# Patient Record
Sex: MALE | Race: WHITE | NOT HISPANIC OR LATINO | Employment: UNEMPLOYED | URBAN - METROPOLITAN AREA
[De-identification: names, ages, dates, MRNs, and addresses within clinical notes are randomized per-mention and may not be internally consistent; named-entity substitution may affect disease eponyms.]

---
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
## 2025-05-19 ENCOUNTER — OFFICE VISIT (OUTPATIENT)
Dept: FAMILY MEDICINE CLINIC | Facility: CLINIC | Age: 20
End: 2025-05-19
Payer: COMMERCIAL

## 2025-05-19 VITALS
SYSTOLIC BLOOD PRESSURE: 109 MMHG | DIASTOLIC BLOOD PRESSURE: 67 MMHG | HEART RATE: 70 BPM | BODY MASS INDEX: 24.11 KG/M2 | WEIGHT: 178 LBS | HEIGHT: 72 IN | OXYGEN SATURATION: 97 %

## 2025-05-19 DIAGNOSIS — K21.9 GASTROESOPHAGEAL REFLUX DISEASE WITHOUT ESOPHAGITIS: Primary | ICD-10-CM

## 2025-05-19 DIAGNOSIS — F90.2 ATTENTION DEFICIT HYPERACTIVITY DISORDER (ADHD), COMBINED TYPE: ICD-10-CM

## 2025-05-19 DIAGNOSIS — H61.23 BILATERAL IMPACTED CERUMEN: ICD-10-CM

## 2025-05-19 DIAGNOSIS — Z23 NEED FOR VACCINATION: ICD-10-CM

## 2025-05-19 DIAGNOSIS — Z00.00 ANNUAL PHYSICAL EXAM: ICD-10-CM

## 2025-05-19 DIAGNOSIS — F32.A ANXIETY AND DEPRESSION: ICD-10-CM

## 2025-05-19 DIAGNOSIS — F41.9 ANXIETY AND DEPRESSION: ICD-10-CM

## 2025-05-19 PROCEDURE — 90471 IMMUNIZATION ADMIN: CPT | Performed by: FAMILY MEDICINE

## 2025-05-19 PROCEDURE — 90651 9VHPV VACCINE 2/3 DOSE IM: CPT | Performed by: FAMILY MEDICINE

## 2025-05-19 PROCEDURE — 69210 REMOVE IMPACTED EAR WAX UNI: CPT | Performed by: FAMILY MEDICINE

## 2025-05-19 PROCEDURE — 99214 OFFICE O/P EST MOD 30 MIN: CPT | Performed by: FAMILY MEDICINE

## 2025-05-19 PROCEDURE — 90472 IMMUNIZATION ADMIN EACH ADD: CPT | Performed by: FAMILY MEDICINE

## 2025-05-19 PROCEDURE — 90620 MENB-4C VACCINE IM: CPT | Performed by: FAMILY MEDICINE

## 2025-05-19 PROCEDURE — 90715 TDAP VACCINE 7 YRS/> IM: CPT | Performed by: FAMILY MEDICINE

## 2025-05-19 PROCEDURE — 99385 PREV VISIT NEW AGE 18-39: CPT | Performed by: FAMILY MEDICINE

## 2025-05-19 RX ORDER — OMEPRAZOLE 20 MG/1
20 CAPSULE, DELAYED RELEASE ORAL DAILY
COMMUNITY

## 2025-05-19 RX ORDER — DEXTROAMPHETAMINE SACCHARATE, AMPHETAMINE ASPARTATE MONOHYDRATE, DEXTROAMPHETAMINE SULFATE AND AMPHETAMINE SULFATE 3.75; 3.75; 3.75; 3.75 MG/1; MG/1; MG/1; MG/1
15 CAPSULE, EXTENDED RELEASE ORAL EVERY MORNING
Qty: 30 CAPSULE | Refills: 0 | Status: SHIPPED | OUTPATIENT
Start: 2025-05-19

## 2025-05-19 RX ORDER — DEXTROAMPHETAMINE SACCHARATE, AMPHETAMINE ASPARTATE, DEXTROAMPHETAMINE SULFATE AND AMPHETAMINE SULFATE 1.875; 1.875; 1.875; 1.875 MG/1; MG/1; MG/1; MG/1
7.5 TABLET ORAL
Qty: 30 TABLET | Refills: 0 | Status: SHIPPED | OUTPATIENT
Start: 2025-05-19

## 2025-05-19 RX ORDER — DEXTROAMPHETAMINE SACCHARATE, AMPHETAMINE ASPARTATE, DEXTROAMPHETAMINE SULFATE AND AMPHETAMINE SULFATE 1.875; 1.875; 1.875; 1.875 MG/1; MG/1; MG/1; MG/1
TABLET ORAL
COMMUNITY
Start: 2025-04-19 | End: 2025-05-19 | Stop reason: SDUPTHER

## 2025-05-19 RX ORDER — MIRTAZAPINE 15 MG/1
15 TABLET, FILM COATED ORAL
COMMUNITY
Start: 2025-04-20 | End: 2025-05-19 | Stop reason: SDUPTHER

## 2025-05-19 RX ORDER — DEXTROAMPHETAMINE SACCHARATE, AMPHETAMINE ASPARTATE MONOHYDRATE, DEXTROAMPHETAMINE SULFATE AND AMPHETAMINE SULFATE 3.75; 3.75; 3.75; 3.75 MG/1; MG/1; MG/1; MG/1
15 CAPSULE, EXTENDED RELEASE ORAL EVERY MORNING
COMMUNITY
Start: 2025-04-21 | End: 2025-05-19 | Stop reason: SDUPTHER

## 2025-05-19 RX ORDER — MIRTAZAPINE 15 MG/1
15 TABLET, FILM COATED ORAL
Qty: 90 TABLET | Refills: 1 | Status: SHIPPED | OUTPATIENT
Start: 2025-05-19

## 2025-05-19 NOTE — PROGRESS NOTES
Chief Complaint  Chief Complaint   Patient presents with    [facility]       Subjective    History of Present Illness  Sharath Lee is a 20 y.o. male presents to [facility] to establish care.  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 Feels rested when he wakes up most of the time. No snoring that he is aware of.   Mood: Pretty good, some increase in irritability but maybe due to being back home and when an internship fell through.   Health goals:     History of Present Illness  The patient presents to [facility].    He is a student at the [employer], currently residing at home for the summer. He maintains an active lifestyle, engaging in outdoor activities such as fishing, camping, and skiing. His diet is generally healthy, with a significant portion of his meals being home-cooked. He is not eating fast food. He aims to increase his physical activity to 5 days per week during the summer. He has a strong support system consisting of his family and friends. His sleep pattern is consistent, averaging 7 to 8 hours per night, and he reports feeling rested upon waking. He is not aware of any snoring. His mood is generally positive, although he has experienced increased irritability recently due to an unfortunate incident related to an internship. He has no specific health goals or concerns at this time.    He takes omeprazole intermittently for acid reflux or heartburn, which he attributes to previous daily marijuana use. He has not undergone endoscopy. He has never had omeprazole as an actual prescription, he only got it over the counter in  the past.    He takes mirtazapine daily, prescribed by his psychiatrist to aid sleep and appetite, which have been affected by his ADHD medication and marijuana use. He reports that mirtazapine has significantly improved his weight and sleep. He also takes Prozac 20 mg for anxiety and depression.    His Adderall dosage includes 7.5 mg IR and 15 mg XR, which he finds beneficial for school and work. He takes the IR dose approximately once every 3 days. He has previously tried Concerta and Vyvanse but prefers Adderall due to its shorter duration of action.    He has no significant childhood diagnoses or surgical history. He has not had his wisdom teeth removed. He consumes approximately one-third of a cigarette weekly and drinks about 3 beers per week. He uses marijuana 3 days per week. He is sexually active with female partners and occasionally uses condoms He does not use e-cigarettes or vaping devices. He has not had an eye examination in the past year but has seen a dentist within the same period.    SOCIAL HISTORY  He is a student at the [employer], currently residing at home for the summer. He consumes approximately one-third of a cigarette weekly and drinks about 3 beers per week. He uses marijuana 3 days per week.    FAMILY HISTORY  His mother and sister have ADHD and anxiety. His father has depression and high blood pressure. He has a cousin with type I diabetes. His paternal grandfather had leukemia. There is no family history of heart attack or stroke.    Current Outpatient Medications on File Prior to Visit   Medication Sig Dispense Refill    omeprazole (priLOSEC) 20 MG capsule Take 1 capsule by mouth Daily.      [DISCONTINUED] amphetamine-dextroamphetamine (ADDERALL) 7.5 MG tablet TAKE 1 TABLET BY MOUTH DAILY IN THE AFTERNOON      [DISCONTINUED] amphetamine-dextroamphetamine XR (ADDERALL XR) 15 MG 24 hr capsule Take 1 capsule by mouth Every Morning      [DISCONTINUED] FLUoxetine (PROzac) 20 MG  capsule Take 1 capsule by mouth Every Morning.      [DISCONTINUED] mirtazapine (REMERON) 15 MG tablet Take 1 tablet by mouth every night at bedtime.       No current facility-administered medications on file prior to visit.       Patient Active Problem List   Diagnosis    Gastroesophageal reflux disease without esophagitis    Attention deficit hyperactivity disorder (ADHD), combined type    Anxiety and depression       Past Medical History:   Diagnosis Date    ADHD (attention deficit hyperactivity disorder)     Anxiety        History reviewed. No pertinent surgical history.    Family History   Problem Relation Age of Onset    Anxiety disorder Mother     ADD / ADHD Mother     Hypertension Father     Depression Father     Anxiety disorder Sister     ADD / ADHD Sister     Leukemia Paternal Grandfather     Diabetes type I Cousin     Hyperlipidemia Neg Hx     Stroke Neg Hx        Health Maintenance Summary            Current Care Gaps       Pneumococcal Vaccine 0-49 (1 of 2 - PCV) Overdue since 4/20/2024      2005  Imm Admin: PEDS-Pneumococcal Conjugate (PCV7)    2005  Imm Admin: PEDS-Pneumococcal Conjugate (PCV7)    2005  Imm Admin: PEDS-Pneumococcal Conjugate (PCV7)              COVID-19 Vaccine (4 - 2024-25 season) Overdue since 9/1/2024 02/20/2022  Imm Admin: Covid-19 (Pfizer) Gray Cap Monovalent    05/25/2021  Imm Admin: COVID-19 (PFIZER) Purple Cap Monovalent    05/04/2021  Imm Admin: COVID-19 (PFIZER) Purple Cap Monovalent              HEPATITIS C SCREENING (Once) Never done     No completion, postpone, or frequency change history exists for this topic.              HPV VACCINES (2 - Male 3-dose series) Due soon on 6/16/2025 05/19/2025  Imm Admin: Hpv9                      Upcoming       INFLUENZA VACCINE (Yearly - July to March) Next due on 7/1/2025 2005  Imm Admin: Influenza Seasonal Injectable    2005  Imm Admin: Influenza Seasonal Injectable               "MENINGOCOCCAL B VACCINE (2 of 2 - Bexsero SCDM 2-dose series) Next due on 11/19/2025 05/19/2025  Imm Admin: Meningococcal B,(Bexsero)              ANNUAL PHYSICAL (Yearly) Next due on 5/19/2026 05/19/2025  Done              TDAP/TD VACCINES (2 - Td or Tdap) Next due on 5/19/2035 05/19/2025  Imm Admin: Tdap                      Completed or No Longer Recommended       MENINGOCOCCAL VACCINE (Series Information) Aged Out     No completion, postpone, or frequency change history exists for this topic.                               Objective   Vitals:    05/19/25 1127   BP: 109/67   Pulse: 70   SpO2: 97%   Weight: 80.7 kg (178 lb)   Height: 182.9 cm (72\")      Physical Exam  Vitals reviewed.   Constitutional:       General: He is not in acute distress.     Appearance: Normal appearance.   HENT:      Head: Normocephalic and atraumatic.      Right Ear: Ear canal and external ear normal. There is impacted cerumen.      Left Ear: Ear canal and external ear normal. There is impacted cerumen.      Nose: Nose normal. No rhinorrhea.      Mouth/Throat:      Mouth: Mucous membranes are moist.      Pharynx: No posterior oropharyngeal erythema.   Eyes:      Extraocular Movements: Extraocular movements intact.      Conjunctiva/sclera: Conjunctivae normal.      Pupils: Pupils are equal, round, and reactive to light.   Neck:      Comments: No thyromegaly or thyroid nodule on palpation  Cardiovascular:      Rate and Rhythm: Normal rate and regular rhythm.      Pulses: Normal pulses.      Heart sounds: Normal heart sounds. No murmur heard.  Pulmonary:      Effort: Pulmonary effort is normal.      Breath sounds: Normal breath sounds. No wheezing.   Abdominal:      General: Bowel sounds are normal. There is no distension.      Palpations: Abdomen is soft.      Tenderness: There is no abdominal tenderness.   Musculoskeletal:         General: No tenderness or deformity. Normal range of motion.      Cervical back: Normal range " of motion and neck supple.   Lymphadenopathy:      Cervical: No cervical adenopathy.   Skin:     General: Skin is warm and dry.      Capillary Refill: Capillary refill takes less than 2 seconds.      Findings: No lesion or rash.   Neurological:      General: No focal deficit present.      Mental Status: He is alert and oriented to person, place, and time.      Gait: Gait normal.      Deep Tendon Reflexes: Reflexes normal.   Psychiatric:         Mood and Affect: Mood normal.         Behavior: Behavior normal.         Judgment: Judgment normal.          PHQ-9 Depression Screening  Little interest or pleasure in doing things? Not at all   Feeling down, depressed, or hopeless? Not at all   PHQ-2 Total Score 0   Trouble falling or staying asleep, or sleeping too much?     Feeling tired or having little energy?     Poor appetite or overeating?     Feeling bad about yourself - or that you are a failure or have let yourself or your family down?     Trouble concentrating on things, such as reading the newspaper or watching television?     Moving or speaking so slowly that other people could have noticed? Or the opposite - being so fidgety or restless that you have been moving around a lot more than usual?     Thoughts that you would be better off dead, or of hurting yourself in some way?     PHQ-9 Total Score     If you checked off any problems, how difficult have these problems made it for you to do your work, take care of things at home, or get along with other people?        ANDREE-7 Anxiety Screening  Feeling nervous, anxious or on edge? Several days   Not being able to stop or control worrying? Several days   Worrying too much about different things? Several days   Trouble relaxing? Not at all   Being so restless that it is hard to sit still? Several days   Becoming easily annoyed or irritable? Several days   Feeling afraid as if something awful might happen? Not at all   ANDREE-7 Total Score 5   If you checked any problems,  how difficult have these problems made it for you to do your work, take care of things at home, or get along with other people? Somewhat difficult       Ear Cerumen Removal    Date/Time: 5/19/2025 5:05 PM    Performed by: Marie Villalpando MD  Authorized by: Marie Villalpando MD    Anesthesia:  Local Anesthetic: none  Location details: left ear and right ear  Patient tolerance: patient tolerated the procedure well with no immediate complications  Procedure type: instrumentation, curette   Sedation:  Patient sedated: no          Assessment and Plan  Sharath Lee is a 20 y.o. male presents to [facility] today to establish care and discuss health goals/concerns, chart reviewed and updated, medications reviewed, labs reviewed, preventative med reviewed. Patient has not been seen by primary care for annual exam in the last 12 months.. Answered all questions at this time, pt expressed no further concerns today.     Preventative medicine:   Eye exam: Not up to date.    Dental exam: Up to date.    BP: normal  Lipid Panel :  not indicated   A1c: not indicated   Hep C screening: Not done  Colon cancer screening UTD- age 45-75: N/A  Lung cancer screening UTD- age 50-75: N/A  Immunizations UTD: Started HPV series, due pneumonia  Anxiety/depression screening: normal  Tobacco cessation counseling: Yes    Men:   Aortic aneurysm screen UTD age 65: will need  PSA UTD- age 50-75:  N/A  Assessment & Plan  1. Health maintenance.  - Advised to reduce processed food intake, especially those containing flour and sugar, and to increase consumption of fruits and vegetables. Adequate hydration is recommended.  - Encouraged to use sunscreen due to melanodeficient skin type. Advised to cease smoking and limit alcohol consumption to no more than 2 beverages per day on average.  - Recommended a minimum of 6 hours of restful sleep, regular exercise, and stress management techniques such as guided meditation using the  Insight Timer shanthi. Blood pressure should be maintained below 140/90.  - Counseling suggested as a beneficial resource for mental health support, particularly during periods of change. Acupressure, magnesium, and ashwagandha recommended as potential supplements for anxiety management.  - Urine drug screen will be conducted today. Vaccinations for HPV, meningitis B, and tetanus will be administered today.    2. Gastroesophageal Reflux Disease (GERD).  - Controlled.  Takes OTC omeprazole (Prilosec) intermittently for acid reflux or heartburn.  - If a prescription is needed, he should inform the provider.    3. Depression and anxiety  - Controlled.  Currently on Prozac 20 mg daily for anxiety and depression.  - Refill for Prozac 20 mg will be sent to pharmacy.    4. Attention Deficit Hyperactivity Disorder (ADHD).  - Controlled.  On Adderall 7.5 mg IR and 15 mg XR. The current regimen provides flexibility.  - UDS today, patient will see PCP for ADHD management when he is home for the summer and psychiatrist for ADHD management when he is at school in [place] due to state prescribing limitations.    Follow-up  - Follow up in mid-August for a medication review.    Diagnoses and all orders for this visit:    1. Gastroesophageal reflux disease without esophagitis (Primary)    2. Attention deficit hyperactivity disorder (ADHD), combined type  -     amphetamine-dextroamphetamine XR (ADDERALL XR) 15 MG 24 hr capsule; Take 1 capsule by mouth Every Morning Indications: Attention Deficit Hyperactivity Disorder  Dispense: 30 capsule; Refill: 0  -     amphetamine-dextroamphetamine (ADDERALL) 7.5 MG tablet; Take 1 tablet by mouth Every Afternoon. Indications: Attention Deficit Hyperactivity Disorder  Dispense: 30 tablet; Refill: 0  -     Urine Drug Screen - Urine, Clean Catch    3. Anxiety and depression  -     mirtazapine (REMERON) 15 MG tablet; Take 1 tablet by mouth every night at bedtime.  Dispense: 90 tablet; Refill: 1  -      FLUoxetine (PROzac) 20 MG capsule; Take 1 capsule by mouth Every Morning. Indications: Generalized Anxiety Disorder, Major Depressive Disorder  Dispense: 90 capsule; Refill: 3    4. Bilateral impacted cerumen  -     Ear Cerumen Removal    5. Need for vaccination  -     HPV Vaccine  -     Bexsero  -     Tdap Vaccine Greater Than or Equal To 6yo IM    6. Annual physical exam        Patient voiced understanding and agreement with plan of care and had no further questions or concerns at this time.     Problems addressed: 2 or more stable chronic illnesses (MODERATE) single self-limited or minor problem  Complexity: labs ordered yes  Risk: prescription drug management, controlled substance    Marie Villalpando MD  Family [facility]    Follow Up  Return in about 3 months (around 8/19/2025) for Recheck, Video visit.    Patient Instructions   Today: UDS and vaccinations.    Meds: No changes- refills sent    Healthy lifestyle tips  - Reduce intake of processed food (shop perimeter of grocery store), especially white flour and sugar  - Increase intake of fruits/veggies  - Drink 32-64oz of water a day  - Use sunscreen spf 30 or higher when going outside, reapply every 2 hours  - Quit smoking if you smoke  - Drink no more than 2 alcoholic beverages/day if you are male, no more than 1 alcoholic beverage/day if you are female  - Get 6-8 hours of restful sleep at night- poor sleep quality has been linked to increased risk of cardiovascular disease  - Voluntary physical activity cumulative 120-150 minutes/week  - Stress management utilizing meditation and mindfulness (InsightTimer, free shanthi)  - Keep blood pressure < 140/90    Heart healthy diet from AHA: https://www.heart.org/en/healthy-living/healthy-eating/eat-smart/nutrition-basics/aha-diet-and-lifestyle-recommendations    MH plan:  - Start counseling if not currently established  - Meditation: check out Insight Timer (free shanthi)  - Sleep hygiene  -  Increase physical activity as tolerated- goal 120mins/week  - Acupressure or EFT (links below)  - Supplements: magnesium, ashwaganda  - May make appt for Dr. Villalpando's acupuncture clinic if desired- Tuesdays, cash pay  - If you experience any thoughts of harming yourself or someone else, please go to the ER immediately.     Resources:  https://www.apa.org/topics/anxiety/    https://www.apa.org/topics/depression/    https://sleepeducation.org/healthy-sleep/healthy-[email or web address]/    https://[employer].[place].ca/Health/pages/conditions.aspx?kccv=day0431&lang=en-ca    https://www.[email or web address].org/cancer-care/patient-education/acupressure-stress-and-anxiety       Patient or patient representative verbalized consent for the use of Ambient Listening during the visit with  Marie Villalpando MD for chart documentation. 5/19/2025  17:07 EDT

## 2025-05-19 NOTE — PATIENT INSTRUCTIONS
Today: UDS and vaccinations.    Meds: No changes- refills sent    Healthy lifestyle tips  - Reduce intake of processed food (shop perimeter of grocery store), especially white flour and sugar  - Increase intake of fruits/veggies  - Drink 32-64oz of water a day  - Use sunscreen spf 30 or higher when going outside, reapply every 2 hours  - Quit smoking if you smoke  - Drink no more than 2 alcoholic beverages/day if you are male, no more than 1 alcoholic beverage/day if you are female  - Get 6-8 hours of restful sleep at night- poor sleep quality has been linked to increased risk of cardiovascular disease  - Voluntary physical activity cumulative 120-150 minutes/week  - Stress management utilizing meditation and mindfulness (InsightTimer, free shanthi)  - Keep blood pressure < 140/90    Heart healthy diet from AHA: https://www.heart.org/en/healthy-living/healthy-eating/eat-smart/nutrition-basics/aha-diet-and-lifestyle-recommendations    MH plan:  - Start counseling if not currently established  - Meditation: check out Insight Timer (free shanthi)  - Sleep hygiene  - Increase physical activity as tolerated- goal 120mins/week  - Acupressure or EFT (links below)  - Supplements: magnesium, ashwaganda  - May make appt for Dr. Villalpando's acupuncture clinic if desired- Tuesdays, cash pay  - If you experience any thoughts of harming yourself or someone else, please go to the ER immediately.     Resources:  https://www.apa.org/topics/anxiety/    https://www.apa.org/topics/depression/    https://sleepeducation.org/healthy-sleep/healthy-sleep-habits/    https://[employer].[place].ca/Health/pages/conditions.aspx?wvbz=peu1940&lang=en-ca    https://www.[facility].org/cancer-care/patient-education/acupressure-[email or web address]-anxiety

## 2025-05-20 LAB
AMPHETAMINES UR QL SCN: NEGATIVE NG/ML
BARBITURATES UR QL SCN: NEGATIVE NG/ML
BENZODIAZ UR QL SCN: NEGATIVE NG/ML
BZE UR QL SCN: NEGATIVE NG/ML
CANNABINOIDS UR QL SCN: POSITIVE NG/ML
CREAT UR-MCNC: 163.6 MG/DL (ref 20–300)
LABORATORY COMMENT REPORT: ABNORMAL
METHADONE UR QL SCN: NEGATIVE NG/ML
OPIATES UR QL SCN: NEGATIVE NG/ML
OXYCODONE+OXYMORPHONE UR QL SCN: NEGATIVE NG/ML
PCP UR QL: NEGATIVE NG/ML
PH UR: 5.1 [PH] (ref 4.5–8.9)
PROPOXYPH UR QL SCN: NEGATIVE NG/ML

## 2025-06-17 DIAGNOSIS — F90.2 ATTENTION DEFICIT HYPERACTIVITY DISORDER (ADHD), COMBINED TYPE: ICD-10-CM

## 2025-06-17 RX ORDER — DEXTROAMPHETAMINE SACCHARATE, AMPHETAMINE ASPARTATE MONOHYDRATE, DEXTROAMPHETAMINE SULFATE AND AMPHETAMINE SULFATE 3.75; 3.75; 3.75; 3.75 MG/1; MG/1; MG/1; MG/1
15 CAPSULE, EXTENDED RELEASE ORAL EVERY MORNING
Qty: 30 CAPSULE | Refills: 0 | Status: SHIPPED | OUTPATIENT
Start: 2025-06-17

## 2025-06-17 RX ORDER — DEXTROAMPHETAMINE SACCHARATE, AMPHETAMINE ASPARTATE, DEXTROAMPHETAMINE SULFATE AND AMPHETAMINE SULFATE 1.875; 1.875; 1.875; 1.875 MG/1; MG/1; MG/1; MG/1
TABLET ORAL
Qty: 30 TABLET | Refills: 0 | Status: SHIPPED | OUTPATIENT
Start: 2025-06-17

## 2025-07-08 DIAGNOSIS — F90.2 ATTENTION DEFICIT HYPERACTIVITY DISORDER (ADHD), COMBINED TYPE: ICD-10-CM

## 2025-07-08 RX ORDER — DEXTROAMPHETAMINE SACCHARATE, AMPHETAMINE ASPARTATE, DEXTROAMPHETAMINE SULFATE AND AMPHETAMINE SULFATE 1.875; 1.875; 1.875; 1.875 MG/1; MG/1; MG/1; MG/1
TABLET ORAL
Qty: 30 TABLET | Refills: 0 | Status: SHIPPED | OUTPATIENT
Start: 2025-07-08

## 2025-07-08 RX ORDER — DEXTROAMPHETAMINE SACCHARATE, AMPHETAMINE ASPARTATE MONOHYDRATE, DEXTROAMPHETAMINE SULFATE AND AMPHETAMINE SULFATE 3.75; 3.75; 3.75; 3.75 MG/1; MG/1; MG/1; MG/1
15 CAPSULE, EXTENDED RELEASE ORAL EVERY MORNING
Qty: 30 CAPSULE | Refills: 0 | Status: SHIPPED | OUTPATIENT
Start: 2025-07-08

## 2025-08-22 ENCOUNTER — TELEMEDICINE (OUTPATIENT)
Dept: FAMILY MEDICINE CLINIC | Facility: CLINIC | Age: 20
End: 2025-08-22
Payer: COMMERCIAL

## 2025-08-22 DIAGNOSIS — F41.9 ANXIETY AND DEPRESSION: ICD-10-CM

## 2025-08-22 DIAGNOSIS — F90.2 ATTENTION DEFICIT HYPERACTIVITY DISORDER (ADHD), COMBINED TYPE: Primary | ICD-10-CM

## 2025-08-22 DIAGNOSIS — F32.A ANXIETY AND DEPRESSION: ICD-10-CM

## 2025-08-22 DIAGNOSIS — K21.9 GASTROESOPHAGEAL REFLUX DISEASE WITHOUT ESOPHAGITIS: ICD-10-CM

## 2025-08-22 PROCEDURE — 99214 OFFICE O/P EST MOD 30 MIN: CPT | Performed by: FAMILY MEDICINE

## 2025-08-22 RX ORDER — MIRTAZAPINE 15 MG/1
15 TABLET, FILM COATED ORAL
Qty: 90 TABLET | Refills: 3 | Status: SHIPPED | OUTPATIENT
Start: 2025-08-22

## 2025-08-22 RX ORDER — DEXTROAMPHETAMINE SACCHARATE, AMPHETAMINE ASPARTATE, DEXTROAMPHETAMINE SULFATE AND AMPHETAMINE SULFATE 1.875; 1.875; 1.875; 1.875 MG/1; MG/1; MG/1; MG/1
7.5 TABLET ORAL
Qty: 30 TABLET | Refills: 0 | Status: SHIPPED | OUTPATIENT
Start: 2025-08-22

## 2025-08-22 RX ORDER — DEXTROAMPHETAMINE SACCHARATE, AMPHETAMINE ASPARTATE MONOHYDRATE, DEXTROAMPHETAMINE SULFATE AND AMPHETAMINE SULFATE 3.75; 3.75; 3.75; 3.75 MG/1; MG/1; MG/1; MG/1
15 CAPSULE, EXTENDED RELEASE ORAL EVERY MORNING
Qty: 30 CAPSULE | Refills: 0 | Status: SHIPPED | OUTPATIENT
Start: 2025-08-22

## 2025-08-22 RX ORDER — OMEPRAZOLE 20 MG/1
20 CAPSULE, DELAYED RELEASE ORAL DAILY
Qty: 90 CAPSULE | Refills: 3 | Status: SHIPPED | OUTPATIENT
Start: 2025-08-22